# Patient Record
Sex: MALE | Race: WHITE | NOT HISPANIC OR LATINO | URBAN - METROPOLITAN AREA
[De-identification: names, ages, dates, MRNs, and addresses within clinical notes are randomized per-mention and may not be internally consistent; named-entity substitution may affect disease eponyms.]

---

## 2022-01-24 ENCOUNTER — WEB ENCOUNTER (OUTPATIENT)
Dept: URBAN - METROPOLITAN AREA CLINIC 113 | Facility: CLINIC | Age: 52
End: 2022-01-24

## 2022-01-24 ENCOUNTER — TELEPHONE ENCOUNTER (OUTPATIENT)
Dept: URBAN - METROPOLITAN AREA CLINIC 113 | Facility: CLINIC | Age: 52
End: 2022-01-24

## 2022-01-24 ENCOUNTER — OFFICE VISIT (OUTPATIENT)
Dept: URBAN - METROPOLITAN AREA CLINIC 113 | Facility: CLINIC | Age: 52
End: 2022-01-24
Payer: COMMERCIAL

## 2022-01-24 VITALS
SYSTOLIC BLOOD PRESSURE: 128 MMHG | WEIGHT: 255 LBS | RESPIRATION RATE: 18 BRPM | BODY MASS INDEX: 32.73 KG/M2 | HEART RATE: 88 BPM | HEIGHT: 74 IN | TEMPERATURE: 97.3 F | DIASTOLIC BLOOD PRESSURE: 70 MMHG

## 2022-01-24 DIAGNOSIS — Z12.11 ENCOUNTER FOR SCREENING COLONOSCOPY FOR NON-HIGH-RISK PATIENT: ICD-10-CM

## 2022-01-24 PROBLEM — 440140008: Status: ACTIVE | Noted: 2022-01-24

## 2022-01-24 PROCEDURE — 99243 OFF/OP CNSLTJ NEW/EST LOW 30: CPT | Performed by: INTERNAL MEDICINE

## 2022-01-24 PROCEDURE — 99203 OFFICE O/P NEW LOW 30 MIN: CPT | Performed by: INTERNAL MEDICINE

## 2022-01-24 RX ORDER — ATORVASTATIN CALCIUM 40 MG/1
1 TABLET TABLET, FILM COATED ORAL ONCE A DAY
Status: ACTIVE | COMMUNITY

## 2022-01-24 RX ORDER — FEBUXOSTAT 40 MG/1
1 TABLET TABLET ORAL ONCE A DAY
Status: ACTIVE | COMMUNITY

## 2022-01-24 RX ORDER — CLOPIDOGREL BISULFATE 75 MG/1
1 TABLET TABLET ORAL ONCE A DAY
Status: ACTIVE | COMMUNITY

## 2022-01-24 RX ORDER — GABAPENTIN 100 MG/1
1 CAPSULE CAPSULE ORAL TWICE DAILY
Status: ACTIVE | COMMUNITY

## 2022-01-24 RX ORDER — VENLAFAXINE HYDROCHLORIDE 37.5 MG/1
1 CAPSULE WITH FOOD CAPSULE, EXTENDED RELEASE ORAL ONCE A DAY
Status: ACTIVE | COMMUNITY

## 2022-01-24 RX ORDER — METOPROLOL TARTRATE 25 MG/1
1/2 TABLET TABLET, FILM COATED ORAL ONCE DAILY
Status: ACTIVE | COMMUNITY

## 2022-01-24 RX ORDER — SODIUM, POTASSIUM,MAG SULFATES 17.5-3.13G
354 ML SOLUTION, RECONSTITUTED, ORAL ORAL ONCE
Qty: 354 MILLILITER | Refills: 0 | OUTPATIENT
Start: 2022-01-24 | End: 2022-01-25

## 2022-01-24 NOTE — PHYSICAL EXAM CONSTITUTIONAL:
alert ,  pleasant, well nourished, in no acute distress , needs assistance with ambulation , uses a cane, aphasia post CVA

## 2022-01-24 NOTE — PHYSICAL EXAM NEUROLOGIC:
oriented to person, place and time , normal mood with an appropriate affect , decreased strength RIGHT upper extremity , decreased strength RIGHT lower extremity

## 2022-01-24 NOTE — HPI-TODAY'S VISIT:
51 year old male with a history of CVA in December 2020 on Plavix with subsequent aphasia and ride sided weakness, hypertension, diabetes, gout and depression is referred by Dr. Chen Cornejo for colon cancer screening. A copy of today's visit will be forwarded to the referring provider.   Patient presents with his wife who provides the majority of the history due to patient's aphasia following a stroke in December 2020. He is able to respond to close ended questions with yes/no. He is currently ambulating with a cane due to subsequent right sided weakness and decreased muscle strength. He has never had a colonoscopy or EGD in the past. He denies a family history of colon cancer, colon polyps, IBD or other GI malignancy. He denies heartburn or dysphagia. He denies nausea, vomiting or abdominal pain. His bowel movements are regular. He denies blood per rectum or melena. He was seen by Dr. Sachin Royal, a vascular specialist, back in March when he was found to have a clot in his right carotid artery. Follow-up imaging revealed resolution of clot. He has not had any other cardiovascular events since his CVA in 12/2020. He denies any pulmonary disease. He is currently on Plavix.

## 2022-02-09 ENCOUNTER — TELEPHONE ENCOUNTER (OUTPATIENT)
Dept: URBAN - METROPOLITAN AREA CLINIC 113 | Facility: CLINIC | Age: 52
End: 2022-02-09

## 2022-02-09 ENCOUNTER — TELEPHONE ENCOUNTER (OUTPATIENT)
Dept: URBAN - METROPOLITAN AREA CLINIC 40 | Facility: CLINIC | Age: 52
End: 2022-02-09

## 2022-02-09 RX ORDER — VENLAFAXINE HYDROCHLORIDE 37.5 MG/1
1 CAPSULE WITH FOOD CAPSULE, EXTENDED RELEASE ORAL ONCE A DAY
Status: ACTIVE | COMMUNITY

## 2022-02-09 RX ORDER — ATORVASTATIN CALCIUM 40 MG/1
1 TABLET TABLET, FILM COATED ORAL ONCE A DAY
Status: ACTIVE | COMMUNITY

## 2022-02-09 RX ORDER — GABAPENTIN 100 MG/1
1 CAPSULE CAPSULE ORAL TWICE DAILY
Status: ACTIVE | COMMUNITY

## 2022-02-09 RX ORDER — METOPROLOL TARTRATE 25 MG/1
1/2 TABLET TABLET, FILM COATED ORAL ONCE DAILY
Status: ACTIVE | COMMUNITY

## 2022-02-09 RX ORDER — CLOPIDOGREL BISULFATE 75 MG/1
1 TABLET TABLET ORAL ONCE A DAY
Status: ACTIVE | COMMUNITY

## 2022-02-09 RX ORDER — SODIUM PICOSULFATE, MAGNESIUM OXIDE, AND ANHYDROUS CITRIC ACID 10; 3.5; 12 MG/160ML; G/160ML; G/160ML
160 ML LIQUID ORAL
Qty: 320 MILLILITER | Refills: 0 | OUTPATIENT

## 2022-02-09 RX ORDER — FEBUXOSTAT 40 MG/1
1 TABLET TABLET ORAL ONCE A DAY
Status: ACTIVE | COMMUNITY

## 2022-02-14 ENCOUNTER — OFFICE VISIT (OUTPATIENT)
Dept: URBAN - METROPOLITAN AREA MEDICAL CENTER 2 | Facility: MEDICAL CENTER | Age: 52
End: 2022-02-14

## 2022-02-17 ENCOUNTER — CLAIMS CREATED FROM THE CLAIM WINDOW (OUTPATIENT)
Dept: URBAN - METROPOLITAN AREA MEDICAL CENTER 2 | Facility: MEDICAL CENTER | Age: 52
End: 2022-02-17

## 2022-02-17 ENCOUNTER — CLAIMS CREATED FROM THE CLAIM WINDOW (OUTPATIENT)
Dept: URBAN - METROPOLITAN AREA MEDICAL CENTER 2 | Facility: MEDICAL CENTER | Age: 52
End: 2022-02-17
Payer: COMMERCIAL

## 2022-02-17 DIAGNOSIS — Z12.11 COLON CANCER SCREENING: ICD-10-CM

## 2022-02-17 PROCEDURE — G0121 COLON CA SCRN NOT HI RSK IND: HCPCS | Performed by: INTERNAL MEDICINE

## 2022-02-17 RX ORDER — METOPROLOL TARTRATE 25 MG/1
1/2 TABLET TABLET, FILM COATED ORAL ONCE DAILY
Status: ACTIVE | COMMUNITY

## 2022-02-17 RX ORDER — VENLAFAXINE HYDROCHLORIDE 37.5 MG/1
1 CAPSULE WITH FOOD CAPSULE, EXTENDED RELEASE ORAL ONCE A DAY
Status: ACTIVE | COMMUNITY

## 2022-02-17 RX ORDER — ATORVASTATIN CALCIUM 40 MG/1
1 TABLET TABLET, FILM COATED ORAL ONCE A DAY
Status: ACTIVE | COMMUNITY

## 2022-02-17 RX ORDER — CLOPIDOGREL BISULFATE 75 MG/1
1 TABLET TABLET ORAL ONCE A DAY
Status: ACTIVE | COMMUNITY

## 2022-02-17 RX ORDER — FEBUXOSTAT 40 MG/1
1 TABLET TABLET ORAL ONCE A DAY
Status: ACTIVE | COMMUNITY

## 2022-02-17 RX ORDER — SODIUM PICOSULFATE, MAGNESIUM OXIDE, AND ANHYDROUS CITRIC ACID 10; 3.5; 12 MG/160ML; G/160ML; G/160ML
160 ML LIQUID ORAL
Qty: 320 MILLILITER | Refills: 0 | Status: ACTIVE | COMMUNITY

## 2022-02-17 RX ORDER — GABAPENTIN 100 MG/1
1 CAPSULE CAPSULE ORAL TWICE DAILY
Status: ACTIVE | COMMUNITY

## 2022-03-02 ENCOUNTER — DASHBOARD ENCOUNTERS (OUTPATIENT)
Age: 52
End: 2022-03-02

## 2022-03-02 ENCOUNTER — OFFICE VISIT (OUTPATIENT)
Dept: URBAN - METROPOLITAN AREA CLINIC 113 | Facility: CLINIC | Age: 52
End: 2022-03-02
Payer: COMMERCIAL

## 2022-03-02 VITALS
RESPIRATION RATE: 20 BRPM | TEMPERATURE: 97.5 F | HEART RATE: 81 BPM | WEIGHT: 250 LBS | HEIGHT: 74 IN | BODY MASS INDEX: 32.08 KG/M2 | SYSTOLIC BLOOD PRESSURE: 152 MMHG | DIASTOLIC BLOOD PRESSURE: 95 MMHG

## 2022-03-02 DIAGNOSIS — K64.8 INTERNAL HEMORRHOIDS: ICD-10-CM

## 2022-03-02 PROCEDURE — 99213 OFFICE O/P EST LOW 20 MIN: CPT

## 2022-03-02 RX ORDER — FEBUXOSTAT 40 MG/1
1 TABLET TABLET ORAL ONCE A DAY
Status: ACTIVE | COMMUNITY

## 2022-03-02 RX ORDER — METOPROLOL TARTRATE 25 MG/1
1/2 TABLET TABLET, FILM COATED ORAL ONCE DAILY
Status: ACTIVE | COMMUNITY

## 2022-03-02 RX ORDER — CLOPIDOGREL BISULFATE 75 MG/1
1 TABLET TABLET ORAL ONCE A DAY
Status: ACTIVE | COMMUNITY

## 2022-03-02 RX ORDER — GABAPENTIN 100 MG/1
1 CAPSULE CAPSULE ORAL TWICE DAILY
Status: ACTIVE | COMMUNITY

## 2022-03-02 RX ORDER — VENLAFAXINE HYDROCHLORIDE 37.5 MG/1
1 CAPSULE WITH FOOD CAPSULE, EXTENDED RELEASE ORAL ONCE A DAY
Status: ACTIVE | COMMUNITY

## 2022-03-02 RX ORDER — SODIUM PICOSULFATE, MAGNESIUM OXIDE, AND ANHYDROUS CITRIC ACID 10; 3.5; 12 MG/160ML; G/160ML; G/160ML
160 ML LIQUID ORAL
Qty: 320 MILLILITER | Refills: 0 | Status: ON HOLD | COMMUNITY

## 2022-03-02 RX ORDER — ATORVASTATIN CALCIUM 40 MG/1
1 TABLET TABLET, FILM COATED ORAL ONCE A DAY
Status: ACTIVE | COMMUNITY

## 2022-03-02 NOTE — HPI-OTHER HISTORIES
Labs 7/13/2021: normal Hgb A1c, normal TSH, normal lipid panel, normal LFTs, normal TB.   He was seen by Dr. Sachin Royal, a vascular specialist, back in March when he was found to have a clot in his right carotid artery. Follow-up imaging revealed resolution of clot. He has not had any other cardiovascular events since his CVA in 12/2020.

## 2022-03-02 NOTE — HPI-TODAY'S VISIT:
51-year-old male with a history of CVA in December 2020 on Plavix with subsequent aphasia and right-sided weakness, hypertension, diabetes, gout and depression presents for follow-up after colonoscopy.  He was last seen on 1/24/2022 for colon cancer screening.  His wife was present for the visit and provided the majority of the history due to the patient's aphasia following a stroke.  He is able to respond to close ended questions with yes/no.  He had never had a colonoscopy or EGD.  He was asymptomatic at the time.  He denied any cardiovascular events since his CVA in December 2020.  After cardiac clearance was obtained, a colonoscopy was planned for colon cancer screening.  Colonoscopy 2/17/2022:Quality of bowel prep was good using Suprep.  Nonbleeding internal hemorrhoids.  Otherwise normal no specimens were collected.  Repeat colonoscopy was recommended in 10 years for screening purposes.   Patient presents without his wife therefore interview was limited to only close ended questions. He is doing well after his procedure. He denies heartburn, dysphagia, nausea, vomiting, abdominal pain, constipation, diarrhea, or rectal bleeding.